# Patient Record
Sex: MALE | Race: WHITE | URBAN - METROPOLITAN AREA
[De-identification: names, ages, dates, MRNs, and addresses within clinical notes are randomized per-mention and may not be internally consistent; named-entity substitution may affect disease eponyms.]

---

## 2018-04-26 ENCOUNTER — OFFICE VISIT (OUTPATIENT)
Dept: FAMILY MEDICINE CLINIC | Facility: CLINIC | Age: 28
End: 2018-04-26

## 2018-04-26 VITALS
RESPIRATION RATE: 18 BRPM | DIASTOLIC BLOOD PRESSURE: 80 MMHG | OXYGEN SATURATION: 100 % | HEIGHT: 68.25 IN | HEART RATE: 96 BPM | WEIGHT: 198 LBS | SYSTOLIC BLOOD PRESSURE: 124 MMHG | BODY MASS INDEX: 30.01 KG/M2 | TEMPERATURE: 98 F

## 2018-04-26 DIAGNOSIS — D17.22 LIPOMA OF LEFT UPPER EXTREMITY: ICD-10-CM

## 2018-04-26 DIAGNOSIS — Z00.00 BLOOD TESTS FOR ROUTINE GENERAL PHYSICAL EXAMINATION: ICD-10-CM

## 2018-04-26 DIAGNOSIS — Z00.00 ROUTINE GENERAL MEDICAL EXAMINATION AT A HEALTH CARE FACILITY: Primary | ICD-10-CM

## 2018-04-26 PROCEDURE — 99385 PREV VISIT NEW AGE 18-39: CPT | Performed by: FAMILY MEDICINE

## 2018-04-26 NOTE — PATIENT INSTRUCTIONS
Fast 8 hours for labs. Water, black coffee, or plain tea only. Any sugar in the system will alter the glucose level and triglyceride level.     Lipoma left forearm, benign, can be removed in the office    Vaccine records:  Tetanus, Tdap, Td

## 2018-04-26 NOTE — PROGRESS NOTES
HPI:  Here for a physical.    PAST MEDICAL HISTORY:  Past Medical History:   Diagnosis Date   • Concussion     fall 2008     PAST SURGICAL HISTORY:  Past Surgical History:  05/05/2002: ORCHIOPEXY   Left  MEDICATIONS:    No current outpatient prescriptions loss.  SKIN: no new or changing moles reported, no rash    EXAM:  /80   Pulse 96   Temp 97.6 °F (36.4 °C) (Oral)   Resp 18   Ht 68.25\"   Wt 198 lb   SpO2 100%   BMI 29.89 kg/m²   NAD  GENERAL: well developed, well nourished, in no apparent distress. PLAN:  Routine health maintenance. Labs ordered: see orders. Administer Tetanus booster : Check for dates of previous tetanus vaccine. [facility] information discussed: Exercise  Additional issues: Lipoma left forearm.         Patient understood above plan and